# Patient Record
Sex: MALE | Race: WHITE | Employment: FULL TIME | URBAN - NONMETROPOLITAN AREA
[De-identification: names, ages, dates, MRNs, and addresses within clinical notes are randomized per-mention and may not be internally consistent; named-entity substitution may affect disease eponyms.]

---

## 2018-02-13 ENCOUNTER — HOSPITAL ENCOUNTER (INPATIENT)
Age: 63
LOS: 3 days | Discharge: HOME | DRG: 310 | End: 2018-02-16
Attending: EMERGENCY MEDICINE | Admitting: HOSPITALIST
Payer: COMMERCIAL

## 2018-02-13 ENCOUNTER — APPOINTMENT (OUTPATIENT)
Dept: GENERAL RADIOLOGY | Age: 63
DRG: 310 | End: 2018-02-13
Payer: COMMERCIAL

## 2018-02-13 ENCOUNTER — APPOINTMENT (OUTPATIENT)
Dept: CT IMAGING | Age: 63
DRG: 310 | End: 2018-02-13
Payer: COMMERCIAL

## 2018-02-13 DIAGNOSIS — I10 HYPERTENSION, UNSPECIFIED TYPE: ICD-10-CM

## 2018-02-13 DIAGNOSIS — R00.2 PALPITATIONS: Primary | ICD-10-CM

## 2018-02-13 DIAGNOSIS — R42 DIZZINESS: ICD-10-CM

## 2018-02-13 LAB
ALBUMIN SERPL-MCNC: 3.9 GM/DL (ref 3.4–5)
ALP BLD-CCNC: 74 U/L (ref 46–116)
ALT SERPL-CCNC: 25 U/L (ref 14–63)
ANION GAP: 8 MEQ/L (ref 8–16)
AST SERPL-CCNC: 23 U/L (ref 15–37)
BASOPHILS # BLD: 0.7 % (ref 0–3)
BILIRUB SERPL-MCNC: 0.5 MG/DL (ref 0.2–1)
BUN BLDV-MCNC: 16 MG/DL (ref 7–18)
CHLORIDE BLD-SCNC: 104 MEQ/L (ref 98–107)
CO2: 28 MEQ/L (ref 21–32)
CREAT SERPL-MCNC: 1 MG/DL (ref 0.6–1.3)
EOSINOPHILS RELATIVE PERCENT: 2.5 % (ref 0–4)
GFR, ESTIMATED: 81 ML/MIN/1.73M2
GLUCOSE BLD-MCNC: 101 MG/DL (ref 74–106)
HCT VFR BLD CALC: 42.5 % (ref 42–52)
HEMOGLOBIN: 14.3 GM/DL (ref 14–18)
LYMPHOCYTES # BLD: 36.7 % (ref 15–47)
MAGNESIUM: 2.1 MG/DL (ref 1.8–2.4)
MCH RBC QN AUTO: 31.2 PG (ref 27–31)
MCHC RBC AUTO-ENTMCNC: 33.8 GM/DL (ref 33–37)
MCV RBC AUTO: 92.4 FL (ref 80–94)
MONOCYTES: 9.3 % (ref 0–12)
PDW BLD-RTO: 11.7 % (ref 11.5–14.5)
PLATELET # BLD: 286 THOU/MM3 (ref 130–400)
PMV BLD AUTO: 6.9 FL (ref 7.4–10.4)
POC CALCIUM: 9 MG/DL (ref 8.5–10.1)
POTASSIUM SERPL-SCNC: 3.5 MEQ/L (ref 3.5–5.1)
RBC # BLD: 4.59 MILL/MM3 (ref 4.7–6.1)
SEGS: 50.8 % (ref 43–75)
SODIUM BLD-SCNC: 140 MEQ/L (ref 136–145)
TOTAL PROTEIN: 8 GM/DL (ref 6.4–8.2)
TROPONIN I: < 0.017 NG/ML (ref 0.02–0.05)
TROPONIN T: < 0.01 NG/ML
TROPONIN T: < 0.01 NG/ML
WBC # BLD: 5.8 THOU/MM3 (ref 4.8–10.8)

## 2018-02-13 PROCEDURE — 83735 ASSAY OF MAGNESIUM: CPT

## 2018-02-13 PROCEDURE — 71046 X-RAY EXAM CHEST 2 VIEWS: CPT

## 2018-02-13 PROCEDURE — 96374 THER/PROPH/DIAG INJ IV PUSH: CPT

## 2018-02-13 PROCEDURE — 6370000000 HC RX 637 (ALT 250 FOR IP): Performed by: EMERGENCY MEDICINE

## 2018-02-13 PROCEDURE — 99285 EMERGENCY DEPT VISIT HI MDM: CPT

## 2018-02-13 PROCEDURE — G0378 HOSPITAL OBSERVATION PER HR: HCPCS

## 2018-02-13 PROCEDURE — 80053 COMPREHEN METABOLIC PANEL: CPT

## 2018-02-13 PROCEDURE — 2580000003 HC RX 258: Performed by: HOSPITALIST

## 2018-02-13 PROCEDURE — 70450 CT HEAD/BRAIN W/O DYE: CPT

## 2018-02-13 PROCEDURE — 99222 1ST HOSP IP/OBS MODERATE 55: CPT | Performed by: HOSPITALIST

## 2018-02-13 PROCEDURE — 36415 COLL VENOUS BLD VENIPUNCTURE: CPT

## 2018-02-13 PROCEDURE — 93005 ELECTROCARDIOGRAM TRACING: CPT | Performed by: EMERGENCY MEDICINE

## 2018-02-13 PROCEDURE — 85025 COMPLETE CBC W/AUTO DIFF WBC: CPT

## 2018-02-13 PROCEDURE — 2500000003 HC RX 250 WO HCPCS: Performed by: EMERGENCY MEDICINE

## 2018-02-13 PROCEDURE — 1200000003 HC TELEMETRY R&B

## 2018-02-13 PROCEDURE — 6370000000 HC RX 637 (ALT 250 FOR IP): Performed by: HOSPITALIST

## 2018-02-13 PROCEDURE — 6360000002 HC RX W HCPCS: Performed by: HOSPITALIST

## 2018-02-13 PROCEDURE — 84484 ASSAY OF TROPONIN QUANT: CPT

## 2018-02-13 RX ORDER — LISINOPRIL 5 MG/1
5 TABLET ORAL DAILY
Status: DISCONTINUED | OUTPATIENT
Start: 2018-02-13 | End: 2018-02-16 | Stop reason: HOSPADM

## 2018-02-13 RX ORDER — POTASSIUM CHLORIDE 7.45 MG/ML
10 INJECTION INTRAVENOUS PRN
Status: DISCONTINUED | OUTPATIENT
Start: 2018-02-13 | End: 2018-02-16 | Stop reason: HOSPADM

## 2018-02-13 RX ORDER — METOPROLOL TARTRATE 5 MG/5ML
5 INJECTION INTRAVENOUS EVERY 6 HOURS PRN
Status: DISCONTINUED | OUTPATIENT
Start: 2018-02-13 | End: 2018-02-16 | Stop reason: HOSPADM

## 2018-02-13 RX ORDER — POTASSIUM CHLORIDE 20 MEQ/1
40 TABLET, EXTENDED RELEASE ORAL PRN
Status: DISCONTINUED | OUTPATIENT
Start: 2018-02-13 | End: 2018-02-16 | Stop reason: HOSPADM

## 2018-02-13 RX ORDER — CHLORTHALIDONE 25 MG/1
25 TABLET ORAL DAILY
Status: DISCONTINUED | OUTPATIENT
Start: 2018-02-13 | End: 2018-02-16 | Stop reason: HOSPADM

## 2018-02-13 RX ORDER — METOPROLOL TARTRATE 5 MG/5ML
INJECTION INTRAVENOUS
Status: DISCONTINUED
Start: 2018-02-13 | End: 2018-02-14

## 2018-02-13 RX ORDER — METOPROLOL TARTRATE 5 MG/5ML
5 INJECTION INTRAVENOUS ONCE
Status: COMPLETED | OUTPATIENT
Start: 2018-02-13 | End: 2018-02-13

## 2018-02-13 RX ORDER — POTASSIUM CHLORIDE 20MEQ/15ML
40 LIQUID (ML) ORAL PRN
Status: DISCONTINUED | OUTPATIENT
Start: 2018-02-13 | End: 2018-02-16 | Stop reason: HOSPADM

## 2018-02-13 RX ORDER — SODIUM CHLORIDE 9 MG/ML
INJECTION, SOLUTION INTRAVENOUS CONTINUOUS
Status: DISCONTINUED | OUTPATIENT
Start: 2018-02-13 | End: 2018-02-13

## 2018-02-13 RX ORDER — METOPROLOL TARTRATE 50 MG/1
25 TABLET, FILM COATED ORAL ONCE
Status: COMPLETED | OUTPATIENT
Start: 2018-02-13 | End: 2018-02-13

## 2018-02-13 RX ORDER — LORAZEPAM 0.5 MG/1
0.5 TABLET ORAL EVERY 6 HOURS PRN
Status: DISCONTINUED | OUTPATIENT
Start: 2018-02-13 | End: 2018-02-16 | Stop reason: HOSPADM

## 2018-02-13 RX ORDER — ONDANSETRON 2 MG/ML
4 INJECTION INTRAMUSCULAR; INTRAVENOUS EVERY 6 HOURS PRN
Status: DISCONTINUED | OUTPATIENT
Start: 2018-02-13 | End: 2018-02-16 | Stop reason: HOSPADM

## 2018-02-13 RX ORDER — ACETAMINOPHEN 325 MG/1
650 TABLET ORAL EVERY 4 HOURS PRN
Status: DISCONTINUED | OUTPATIENT
Start: 2018-02-13 | End: 2018-02-16 | Stop reason: HOSPADM

## 2018-02-13 RX ORDER — HYDRALAZINE HYDROCHLORIDE 20 MG/ML
10 INJECTION INTRAMUSCULAR; INTRAVENOUS EVERY 6 HOURS PRN
Status: DISCONTINUED | OUTPATIENT
Start: 2018-02-13 | End: 2018-02-16 | Stop reason: HOSPADM

## 2018-02-13 RX ORDER — SODIUM CHLORIDE 0.9 % (FLUSH) 0.9 %
10 SYRINGE (ML) INJECTION EVERY 12 HOURS SCHEDULED
Status: DISCONTINUED | OUTPATIENT
Start: 2018-02-13 | End: 2018-02-16 | Stop reason: HOSPADM

## 2018-02-13 RX ORDER — SODIUM CHLORIDE 0.9 % (FLUSH) 0.9 %
10 SYRINGE (ML) INJECTION PRN
Status: DISCONTINUED | OUTPATIENT
Start: 2018-02-13 | End: 2018-02-16 | Stop reason: HOSPADM

## 2018-02-13 RX ORDER — DOCUSATE SODIUM 100 MG/1
100 CAPSULE, LIQUID FILLED ORAL 2 TIMES DAILY
Status: DISCONTINUED | OUTPATIENT
Start: 2018-02-13 | End: 2018-02-16 | Stop reason: HOSPADM

## 2018-02-13 RX ADMIN — CHLORTHALIDONE 25 MG: 25 TABLET ORAL at 19:48

## 2018-02-13 RX ADMIN — HYDRALAZINE HYDROCHLORIDE 10 MG: 20 INJECTION INTRAMUSCULAR; INTRAVENOUS at 15:52

## 2018-02-13 RX ADMIN — LISINOPRIL 5 MG: 5 TABLET ORAL at 19:48

## 2018-02-13 RX ADMIN — DOCUSATE SODIUM 100 MG: 100 CAPSULE ORAL at 21:42

## 2018-02-13 RX ADMIN — ENOXAPARIN SODIUM 40 MG: 40 INJECTION SUBCUTANEOUS at 15:51

## 2018-02-13 RX ADMIN — Medication 10 ML: at 21:42

## 2018-02-13 RX ADMIN — METOPROLOL TARTRATE 25 MG: 50 TABLET, FILM COATED ORAL at 12:12

## 2018-02-13 RX ADMIN — Medication 10 ML: at 15:52

## 2018-02-13 RX ADMIN — METOROPROLOL TARTRATE 5 MG: 5 INJECTION, SOLUTION INTRAVENOUS at 13:25

## 2018-02-13 ASSESSMENT — ENCOUNTER SYMPTOMS
SORE THROAT: 0
SHORTNESS OF BREATH: 1
WHEEZING: 0
NAUSEA: 0
COUGH: 0
ABDOMINAL PAIN: 0

## 2018-02-13 ASSESSMENT — HEART SCORE: ECG: 1

## 2018-02-13 NOTE — ED PROVIDER NOTES
reviewed and are negative. PAST MEDICAL HISTORY     has a past medical history of Hypertension. SURGICAL HISTORY     has no past surgical history on file. CURRENT MEDICATIONS    There are no discharge medications for this patient. ALLERGIES    has No Known Allergies. FAMILY HISTORY      Uncle with heart disease in his forties. Father  of colon cancer, one sibling with lymphoma, one with breast cancer. indicated that his mother is . He indicated that his father is . He indicated that the status of his paternal uncle is unknown.    family history includes Cancer in his father; Coronary Art Dis in his paternal uncle; Stroke in his mother. SOCIAL HISTORY     reports that he has never smoked. He has never used smokeless tobacco. He reports that he drinks about 1.8 oz of alcohol per week . He reports that he does not use drugs. PHYSICAL EXAM       INITIAL VITALS: BP (!) 182/103   Pulse 68   Temp 98.1 °F (36.7 °C) (Oral)   Resp 20   Ht 6' (1.829 m)   Wt 204 lb (92.5 kg)   SpO2 99%   BMI 27.67 kg/m²        Physical Exam   Constitutional: He is oriented to person, place, and time. He appears well-developed and well-nourished. He appears distressed. HENT:   Head: Atraumatic. Right Ear: External ear normal.   Left Ear: External ear normal.   Nose: Nose normal.   Mouth/Throat: Oropharynx is clear and moist. No oropharyngeal exudate. Eyes: EOM are normal. Pupils are equal, round, and reactive to light. Neck: Neck supple. No JVD present. No thyromegaly present. Cardiovascular: Intact distal pulses. No murmur heard. irregular rhythm, tachycardia   Pulmonary/Chest: Effort normal and breath sounds normal. No respiratory distress. He has no wheezes. Abdominal: Soft. Bowel sounds are normal. He exhibits no mass. There is no tenderness. Musculoskeletal: He exhibits no edema or tenderness. Lymphadenopathy:     He has no cervical adenopathy.    Neurological: He is SpO2: 96%  98% 99%   Weight:   204 lb (92.5 kg)    Height:   6' (1.829 m)        EMERGENCY DEPARTMENT COURSE:    He had already taken  mg orally at home. He was given oral Lopressor 25 mg, with BP not significantly changed. One run of #7 PVC's in a row, followed soon by another run of 3 PVC's in a row observed on the monitor. NO Sx reported with these episodes. Additional IV Lopressor 5 mg given X 2, with BP down to 176/105, HR 78, no more ectopy noted. Test results and plan of care discussed with the patient. Arrangements made for admission. FINAL IMPRESSION      1. Palpitations    2. Dizziness    3. Hypertension, unspecified type        DISPOSITION/PLAN    DISPOSITION Decision To Admit 02/13/2018 12:23:40 PM to [facility], Dr. Taylor Rosario, ambulance transport.     Critical care: 30 minutes               (Please note that portions of this note were completed with a voice recognition program.  Efforts were made to edit the dictations but occasionally words are mis-transcribed.)      Nataly Hodge MD  02/13/18 8124

## 2018-02-13 NOTE — Clinical Note
Patient Class: Observation [104]   REQUIRED: Diagnosis: Intermittent palpitations [[id number]]   Estimated Length of Stay: Estimated stay of less than 2 midnights   Future Attending Provider: [facility] [[id number]]   Admitting Provider: [facility] [[id number]]   Telemetry Bed Required?: Yes

## 2018-02-13 NOTE — ED NOTES
Patient noted to have a run of 7 PVC's on cardiac monitor although did not notice anything.      Janet Lama RN  02/13/18 8476

## 2018-02-13 NOTE — H&P
History & Physical        Patient:  Henny Fernández  YOB: 1955    MRN: [id number]     Acct: [de-identified]    PCP: Barby Llamas    Date of Admission: 2/13/2018    Date of Service: Pt seen/examined on 02/13/2018 and Placed in Observation. Chief Complaint:  palpitations      History Of Present Illness:      66 y.o. male who presented to [address] [place] [address] transferred from [facility] ER after presenting with a main complaint of intermittent palpitations and lightheadedness that happened this morning while at work. Patient states he is going through a lot of stressors in his personal life and this felt like panic attacks which he used to have in the past. At [facility] ER he was found to have and elevated blood pressure and transfer here for further evaluation. At the time of my evaluation he states to feel better and denies any current symptoms such as nausea, vomiting,SOB or palpitations. Past Medical History:          Diagnosis Date    Hypertension     \" borderline\"       Past Surgical History:      History reviewed. No pertinent surgical history. Medications Prior to Admission:      Prior to Admission medications    Not on File       Allergies:  Review of patient's allergies indicates no known allergies. Social History:      The patient currently lives at home    TOBACCO:   reports that he has never smoked. He has never used smokeless tobacco.  ETOH:   reports that he drinks about 1.8 oz of alcohol per week . Family History:      Reviewed in detail and positive as follows:        Problem Relation Age of Onset   [facility] Stroke Mother     Cancer Father     Coronary Art Dis Paternal Uncle        Diet:  DIET GENERAL;    REVIEW OF SYSTEMS:   Pertinent positives as noted in the HPI. All other systems reviewed and negative.     PHYSICAL EXAM:    BP (!) 171/96   Pulse 79   Temp 98.1 °F (36.7 °C) (Oral)   Resp 20   Ht 6' (1.829 m)   Wt 204 lb (92.5 kg)   SpO2 98%   BMI 27.67 kg/m² General appearance:  No apparent distress, appears stated age and cooperative. HEENT:  Normal cephalic, atraumatic without obvious deformity. Pupils equal, round, and reactive to light. Extra ocular muscles intact. Conjunctivae/corneas clear. Neck: Supple, with full range of motion. No jugular venous distention. Trachea midline. Respiratory:  Normal respiratory effort. Clear to auscultation, bilaterally without Rales/Wheezes/Rhonchi. Cardiovascular:  Regular rate and rhythm with normal S1/S2 without murmurs, rubs or gallops. Abdomen: Soft, non-tender, non-distended with normal bowel sounds. Musculoskeletal:  No clubbing, cyanosis or edema bilaterally. Full range of motion without deformity. Skin: Skin color, texture, turgor normal.  No rashes or lesions. Neurologic:  Neurovascularly intact without any focal sensory/motor deficits. Cranial nerves: II-XII intact, grossly non-focal.  Psychiatric:  Alert and oriented, thought content appropriate, normal insight  Capillary Refill: Brisk,< 3 seconds   Peripheral Pulses: +2 palpable, equal bilaterally       Labs:     Recent Labs      02/13/18   1055   WBC  5.8   HGB  14.3   HCT  42.5   PLT  286     Recent Labs      02/13/18   1055   NA  140   K  3.5   CL  104   CO2  28   BUN  16   CREATININE  1.0     Recent Labs      02/13/18   1055   AST  23   ALT  25   BILITOT  0.5   ALKPHOS  74     No results for input(s): INR in the last 72 hours. Recent Labs      02/13/18   1055   TROPONINI  < 0.017       Urinalysis:    No results found for: [place], BACTERIA, RBCUA, BLOODU, [address], GLUCOSEU    Radiology:       EKG:  I have reviewed the EKG with the following interpretation: NSR, PVC    XR CHEST STANDARD (2 VW)   Final Result   No acute cardiopulmonary disease               **This report has been created using voice recognition software. It may contain minor errors which are inherent in voice recognition technology. **      Final report electronically signed by Dr. Rachel Berman on 2/13/2018 11:15 AM      CT HEAD WO CONTRAST   Final Result    No evidence of an acute process. **This report has been created using voice recognition software. It may contain minor errors which are inherent in voice recognition technology. **      Final report electronically signed by Dr. Rachel Berman on 2/13/2018 11:05 AM               DVT prophylaxis: [x] Lovenox                                 [] SCDs                                 [] SQ Heparin                                 [] Encourage ambulation           [] Already on Anticoagulation    Code Status: Full Code          Disposition:    [x] Home       [] TCU       [] Rehab       [] Psych       [] SNF       [] [facility]       [] Other-    ASSESSMENT:    Active Hospital Problems    Diagnosis Date Noted    Intermittent palpitations [R00.2] 02/13/2018       PLAN:    1. HTN (newly diagnosed?)  -No history of HTN and  Unclear if this is due to a \"panic attack\"  -Will start low dose thiazide and ACE  -Hydralazine IV PRN  -He has been advised on close follow up with PCP for optimization of BP meds and other labs for further risk stratification. I will avoid to do them here to avoid anxiety. 2. Suspect Panic Attack  -Low dose benzos PRN    Thank you Melinda Hoover for the opportunity to be involved in this patient's care.     Electronically signed by Lizeth Ortiz MD on 2/13/2018 at 4:48 PM

## 2018-02-13 NOTE — ED NOTES
Ambulated to bathroom. Steady on feet. States he still feels slightly dizzy at this time but no different when walking vs laying down.      Mary Carmen Collazo RN  02/13/18 9108

## 2018-02-14 ENCOUNTER — APPOINTMENT (OUTPATIENT)
Dept: MRI IMAGING | Age: 63
DRG: 310 | End: 2018-02-14
Payer: COMMERCIAL

## 2018-02-14 PROBLEM — Z86.73 OLD CEREBROVASCULAR ACCIDENT (CVA) WITHOUT LATE EFFECT: Status: ACTIVE | Noted: 2018-02-14

## 2018-02-14 PROBLEM — I10 ESSENTIAL HYPERTENSION: Status: ACTIVE | Noted: 2018-02-14

## 2018-02-14 PROBLEM — R42 DIZZINESS: Status: ACTIVE | Noted: 2018-02-14

## 2018-02-14 LAB
ANION GAP SERPL CALCULATED.3IONS-SCNC: 13 MEQ/L (ref 8–16)
AVERAGE GLUCOSE: 93 MG/DL (ref 70–126)
BUN BLDV-MCNC: 12 MG/DL (ref 7–22)
CALCIUM SERPL-MCNC: 8.9 MG/DL (ref 8.5–10.5)
CHLORIDE BLD-SCNC: 100 MEQ/L (ref 98–111)
CHOLESTEROL, TOTAL: 181 MG/DL (ref 100–199)
CO2: 28 MEQ/L (ref 23–33)
CREAT SERPL-MCNC: 0.9 MG/DL (ref 0.4–1.2)
EKG ATRIAL RATE: 93 BPM
EKG ATRIAL RATE: 94 BPM
EKG P AXIS: 37 DEGREES
EKG P AXIS: 39 DEGREES
EKG P-R INTERVAL: 144 MS
EKG P-R INTERVAL: 148 MS
EKG Q-T INTERVAL: 378 MS
EKG Q-T INTERVAL: 378 MS
EKG QRS DURATION: 86 MS
EKG QRS DURATION: 92 MS
EKG QTC CALCULATION (BAZETT): 469 MS
EKG QTC CALCULATION (BAZETT): 472 MS
EKG R AXIS: 58 DEGREES
EKG R AXIS: 62 DEGREES
EKG T AXIS: 24 DEGREES
EKG T AXIS: 32 DEGREES
EKG VENTRICULAR RATE: 93 BPM
EKG VENTRICULAR RATE: 94 BPM
GFR SERPL CREATININE-BSD FRML MDRD: 86 ML/MIN/1.73M2
GLUCOSE BLD-MCNC: 104 MG/DL (ref 70–108)
HBA1C MFR BLD: 5.1 % (ref 4.4–6.4)
HCT VFR BLD CALC: 43.1 % (ref 42–52)
HDLC SERPL-MCNC: 101 MG/DL
HEMOGLOBIN: 14.8 GM/DL (ref 14–18)
LDL CHOLESTEROL CALCULATED: 52 MG/DL
LV EF: 58 %
LVEF MODALITY: NORMAL
MAGNESIUM: 2.1 MG/DL (ref 1.6–2.4)
MCH RBC QN AUTO: 32.1 PG (ref 27–31)
MCHC RBC AUTO-ENTMCNC: 34.4 GM/DL (ref 33–37)
MCV RBC AUTO: 93.4 FL (ref 80–94)
PDW BLD-RTO: 13.2 % (ref 11.5–14.5)
PLATELET # BLD: 276 THOU/MM3 (ref 130–400)
PMV BLD AUTO: 7.3 FL (ref 7.4–10.4)
POTASSIUM REFLEX MAGNESIUM: 3.6 MEQ/L (ref 3.5–5.2)
RBC # BLD: 4.61 MILL/MM3 (ref 4.7–6.1)
SODIUM BLD-SCNC: 141 MEQ/L (ref 135–145)
TRIGL SERPL-MCNC: 142 MG/DL (ref 0–199)
WBC # BLD: 5.7 THOU/MM3 (ref 4.8–10.8)

## 2018-02-14 PROCEDURE — 2580000003 HC RX 258: Performed by: HOSPITALIST

## 2018-02-14 PROCEDURE — 86141 C-REACTIVE PROTEIN HS: CPT

## 2018-02-14 PROCEDURE — 6360000002 HC RX W HCPCS: Performed by: HOSPITALIST

## 2018-02-14 PROCEDURE — G0378 HOSPITAL OBSERVATION PER HR: HCPCS

## 2018-02-14 PROCEDURE — 99233 SBSQ HOSP IP/OBS HIGH 50: CPT | Performed by: INTERNAL MEDICINE

## 2018-02-14 PROCEDURE — 6370000000 HC RX 637 (ALT 250 FOR IP): Performed by: INTERNAL MEDICINE

## 2018-02-14 PROCEDURE — 70551 MRI BRAIN STEM W/O DYE: CPT

## 2018-02-14 PROCEDURE — 6370000000 HC RX 637 (ALT 250 FOR IP): Performed by: HOSPITALIST

## 2018-02-14 PROCEDURE — 93306 TTE W/DOPPLER COMPLETE: CPT

## 2018-02-14 PROCEDURE — 83036 HEMOGLOBIN GLYCOSYLATED A1C: CPT

## 2018-02-14 PROCEDURE — 99223 1ST HOSP IP/OBS HIGH 75: CPT | Performed by: INTERNAL MEDICINE

## 2018-02-14 PROCEDURE — 85027 COMPLETE CBC AUTOMATED: CPT

## 2018-02-14 PROCEDURE — 93010 ELECTROCARDIOGRAM REPORT: CPT | Performed by: INTERNAL MEDICINE

## 2018-02-14 PROCEDURE — 1200000003 HC TELEMETRY R&B

## 2018-02-14 PROCEDURE — 36415 COLL VENOUS BLD VENIPUNCTURE: CPT

## 2018-02-14 PROCEDURE — 80048 BASIC METABOLIC PNL TOTAL CA: CPT

## 2018-02-14 PROCEDURE — 80061 LIPID PANEL: CPT

## 2018-02-14 PROCEDURE — 83735 ASSAY OF MAGNESIUM: CPT

## 2018-02-14 RX ORDER — ASPIRIN 81 MG/1
81 TABLET, CHEWABLE ORAL DAILY
Status: DISCONTINUED | OUTPATIENT
Start: 2018-02-14 | End: 2018-02-16 | Stop reason: HOSPADM

## 2018-02-14 RX ORDER — CARVEDILOL 3.12 MG/1
3.12 TABLET ORAL 2 TIMES DAILY WITH MEALS
Status: DISCONTINUED | OUTPATIENT
Start: 2018-02-14 | End: 2018-02-15

## 2018-02-14 RX ADMIN — ENOXAPARIN SODIUM 40 MG: 40 INJECTION SUBCUTANEOUS at 16:25

## 2018-02-14 RX ADMIN — CARVEDILOL 3.12 MG: 3.12 TABLET, FILM COATED ORAL at 16:23

## 2018-02-14 RX ADMIN — Medication 10 ML: at 08:22

## 2018-02-14 RX ADMIN — DOCUSATE SODIUM 100 MG: 100 CAPSULE ORAL at 20:10

## 2018-02-14 RX ADMIN — Medication 10 ML: at 20:10

## 2018-02-14 RX ADMIN — ASPIRIN 81 MG: 81 TABLET, CHEWABLE ORAL at 16:22

## 2018-02-14 RX ADMIN — LISINOPRIL 5 MG: 5 TABLET ORAL at 08:22

## 2018-02-14 RX ADMIN — CHLORTHALIDONE 25 MG: 25 TABLET ORAL at 08:21

## 2018-02-14 ASSESSMENT — PAIN SCALES - GENERAL
PAINLEVEL_OUTOF10: 0

## 2018-02-14 NOTE — PLAN OF CARE
Problem: Falls - Risk of  Goal: Absence of falls  Outcome: Met This Shift  NO fall this shift. Pt is up independent. Problem: Safety:  Goal: Free from accidental physical injury  Free from accidental physical injury   Outcome: Met This Shift  Pt is alert and oriented. Independent. Problem: Pain:  Goal: Patient's pain/discomfort is manageable  Patient's pain/discomfort is manageable   Outcome: Met This Shift  Denies any pain will continue to  monitor. Problem: Discharge Planning:  Goal: Patients continuum of care needs are met  Patients continuum of care needs are met   Outcome: Ongoing  Pt is anxious to go home today. Several test in progress. Will wait for outcome to determine POC. Comments: Care plan reviewed with patient . Patient verbalize understanding of the plan of care and contribute to goal setting.   Electronically signed by Shawn Elizabeth RN on 2/14/2018 at 2:40 PM

## 2018-02-15 ENCOUNTER — APPOINTMENT (OUTPATIENT)
Dept: INTERVENTIONAL RADIOLOGY/VASCULAR | Age: 63
DRG: 310 | End: 2018-02-15
Payer: COMMERCIAL

## 2018-02-15 PROBLEM — I48.0 PAF (PAROXYSMAL ATRIAL FIBRILLATION) (HCC): Status: ACTIVE | Noted: 2018-02-13

## 2018-02-15 LAB
C-REACTIVE PROTEIN, HIGH SENSITIVITY: 5.7 MG/L
POTASSIUM SERPL-SCNC: 3.8 MEQ/L (ref 3.5–5.2)
T4 FREE: 1.59 NG/DL (ref 0.93–1.76)
TSH SERPL DL<=0.05 MIU/L-ACNC: 0.38 UIU/ML (ref 0.4–4.2)

## 2018-02-15 PROCEDURE — 93880 EXTRACRANIAL BILAT STUDY: CPT

## 2018-02-15 PROCEDURE — 1200000003 HC TELEMETRY R&B

## 2018-02-15 PROCEDURE — 84439 ASSAY OF FREE THYROXINE: CPT

## 2018-02-15 PROCEDURE — 6370000000 HC RX 637 (ALT 250 FOR IP): Performed by: HOSPITALIST

## 2018-02-15 PROCEDURE — 6370000000 HC RX 637 (ALT 250 FOR IP): Performed by: PHYSICIAN ASSISTANT

## 2018-02-15 PROCEDURE — 84443 ASSAY THYROID STIM HORMONE: CPT

## 2018-02-15 PROCEDURE — 36415 COLL VENOUS BLD VENIPUNCTURE: CPT

## 2018-02-15 PROCEDURE — 1200000000 HC SEMI PRIVATE

## 2018-02-15 PROCEDURE — 84132 ASSAY OF SERUM POTASSIUM: CPT

## 2018-02-15 PROCEDURE — 99232 SBSQ HOSP IP/OBS MODERATE 35: CPT | Performed by: PHYSICIAN ASSISTANT

## 2018-02-15 PROCEDURE — 2580000003 HC RX 258: Performed by: HOSPITALIST

## 2018-02-15 PROCEDURE — 99232 SBSQ HOSP IP/OBS MODERATE 35: CPT | Performed by: INTERNAL MEDICINE

## 2018-02-15 PROCEDURE — 6370000000 HC RX 637 (ALT 250 FOR IP): Performed by: INTERNAL MEDICINE

## 2018-02-15 RX ORDER — ASPIRIN 81 MG/1
81 TABLET, CHEWABLE ORAL DAILY
Qty: 30 TABLET | Refills: 3 | Status: SHIPPED | OUTPATIENT
Start: 2018-02-16

## 2018-02-15 RX ORDER — CARVEDILOL 6.25 MG/1
6.25 TABLET ORAL 2 TIMES DAILY WITH MEALS
Status: DISCONTINUED | OUTPATIENT
Start: 2018-02-15 | End: 2018-02-16 | Stop reason: HOSPADM

## 2018-02-15 RX ORDER — POTASSIUM CHLORIDE 20 MEQ/1
20 TABLET, EXTENDED RELEASE ORAL
Status: DISCONTINUED | OUTPATIENT
Start: 2018-02-16 | End: 2018-02-16 | Stop reason: HOSPADM

## 2018-02-15 RX ORDER — LISINOPRIL 5 MG/1
5 TABLET ORAL DAILY
Qty: 30 TABLET | Refills: 3 | Status: SHIPPED | OUTPATIENT
Start: 2018-02-16

## 2018-02-15 RX ORDER — CARVEDILOL 6.25 MG/1
6.25 TABLET ORAL 2 TIMES DAILY WITH MEALS
Qty: 60 TABLET | Refills: 3 | Status: SHIPPED | OUTPATIENT
Start: 2018-02-15

## 2018-02-15 RX ADMIN — CHLORTHALIDONE 25 MG: 25 TABLET ORAL at 08:37

## 2018-02-15 RX ADMIN — CARVEDILOL 6.25 MG: 6.25 TABLET, FILM COATED ORAL at 18:06

## 2018-02-15 RX ADMIN — CARVEDILOL 6.25 MG: 6.25 TABLET, FILM COATED ORAL at 10:22

## 2018-02-15 RX ADMIN — DOCUSATE SODIUM 100 MG: 100 CAPSULE ORAL at 22:52

## 2018-02-15 RX ADMIN — LISINOPRIL 5 MG: 5 TABLET ORAL at 08:37

## 2018-02-15 RX ADMIN — DOCUSATE SODIUM 100 MG: 100 CAPSULE ORAL at 08:38

## 2018-02-15 RX ADMIN — ASPIRIN 81 MG: 81 TABLET, CHEWABLE ORAL at 08:37

## 2018-02-15 RX ADMIN — Medication 10 ML: at 08:37

## 2018-02-15 ASSESSMENT — PAIN SCALES - GENERAL
PAINLEVEL_OUTOF10: 0

## 2018-02-15 NOTE — CARE COORDINATION
UPDATE:    Echo - 2/14/18   Ejection fraction was   estimated at 55 to 60 %. Adjusting medications/increased Coreg, monitoring Magnesium and K+3.8 today  Monitoring B/P 161/92  To 145/104 today, TSH 0.384      Abnormal Imaging:   MRI brain 2/14-      Impression:           1. No evidence of an acute process. 2. Small old infarcts in the right cerebellum and left thalamus. 3. Minimal severity chronic small vessel ischemic changes. Discharge: plans home with son.

## 2018-02-15 NOTE — FLOWSHEET NOTE
02/15/18 1558   Provider Notification   Reason for Communication Review case   Provider Name DR Brown   Provider Notification Physician   Method of Communication Secure Message   Response Waiting for response   Notification Time    notified that DR Fredy Steele doesn't feel pt needs Xarelto currently. Will further review after 48 holter monitor.  Possible need for loop recorder

## 2018-02-15 NOTE — CONSULTS
CAD. He is not aware of dyslipidemia. In fact,, his lipid profile is excellent without any treatment. He is not a smoker, and his BP is noted on admission. Very few CVRF. Patient Active Problem List    Diagnosis Date Noted    Dizziness 02/14/2018     Priority: Low    Old cerebrovascular accident (CVA)  02/14/2018     Priority: Low    Essential hypertension 02/14/2018     Priority: Low    Intermittent palpitations 02/13/2018     Priority: Low         PLAN:    I strongly advised him to decrease ETOH intake. In the meantime, he needs replacement of K.  Echocardiogram was good. Lipid profile is excellent. I agree with Carvedilol for treatment of hypertension and PVC's. I would add an ACEI or ARB if needed for better control of BP. I would consider adding Norvasc if needed, but I think that would not be necessary if he decreases his ETOH intake. I don't prefer diuretics at this time. I recommend follow up in the office within 1-2 months.       Brigida España MD, [facility]  8:18 PM  2/14/2018

## 2018-02-16 VITALS
HEIGHT: 72 IN | HEART RATE: 91 BPM | BODY MASS INDEX: 26.7 KG/M2 | DIASTOLIC BLOOD PRESSURE: 82 MMHG | OXYGEN SATURATION: 98 % | WEIGHT: 197.1 LBS | RESPIRATION RATE: 17 BRPM | SYSTOLIC BLOOD PRESSURE: 131 MMHG | TEMPERATURE: 98.1 F

## 2018-02-16 LAB — POTASSIUM SERPL-SCNC: 4 MEQ/L (ref 3.5–5.2)

## 2018-02-16 PROCEDURE — 36415 COLL VENOUS BLD VENIPUNCTURE: CPT

## 2018-02-16 PROCEDURE — 6370000000 HC RX 637 (ALT 250 FOR IP): Performed by: HOSPITALIST

## 2018-02-16 PROCEDURE — 84132 ASSAY OF SERUM POTASSIUM: CPT

## 2018-02-16 PROCEDURE — 99239 HOSP IP/OBS DSCHRG MGMT >30: CPT | Performed by: INTERNAL MEDICINE

## 2018-02-16 PROCEDURE — 6370000000 HC RX 637 (ALT 250 FOR IP): Performed by: PHYSICIAN ASSISTANT

## 2018-02-16 PROCEDURE — 93226 XTRNL ECG REC<48 HR SCAN A/R: CPT

## 2018-02-16 PROCEDURE — 93225 XTRNL ECG REC<48 HRS REC: CPT

## 2018-02-16 PROCEDURE — 6370000000 HC RX 637 (ALT 250 FOR IP): Performed by: INTERNAL MEDICINE

## 2018-02-16 RX ADMIN — POTASSIUM CHLORIDE 20 MEQ: 1500 TABLET, EXTENDED RELEASE ORAL at 09:13

## 2018-02-16 RX ADMIN — ASPIRIN 81 MG: 81 TABLET, CHEWABLE ORAL at 09:13

## 2018-02-16 RX ADMIN — LISINOPRIL 5 MG: 5 TABLET ORAL at 09:13

## 2018-02-16 RX ADMIN — DOCUSATE SODIUM 100 MG: 100 CAPSULE ORAL at 09:13

## 2018-02-16 RX ADMIN — CARVEDILOL 6.25 MG: 6.25 TABLET, FILM COATED ORAL at 09:13

## 2018-02-16 RX ADMIN — CHLORTHALIDONE 25 MG: 25 TABLET ORAL at 09:13

## 2018-02-16 ASSESSMENT — PAIN SCALES - GENERAL
PAINLEVEL_OUTOF10: 0
PAINLEVEL_OUTOF10: 0

## 2018-02-16 NOTE — PROGRESS NOTES
Educated on discharge instructions, follow up appointments, and medications. No further questions or concerns voiced. Discharged home with significant other.
Hospitalist Progress Note    Patient:  Bryn Sol      Unit/Bed:8B-38/038-A    YOB: 1955    MRN: [id number]       Acct: [de-identified]     PCP: Caroline Khan    Date of Admission: 2/13/2018  Chief Complaint:   Chief Complaint   Patient presents with    Panic Attack    Dizziness       Subjective:[place] is a 66 y.o. male admitted to [address] [place] [address] on 2/13/2018 for  Dizziness and palpitations, symptoms resolving, discussed findings and updated plan of care    REVIEW OF SYSTEMS:   CONSTITUTIONAL: Denies fevers, denies recent illnesses. EYES: Denies any vision changes. ENT: Denies any throat pain. NECK: Denies any neck pain. CARDIOVASCULAR: Denies chest pain. Denies palpitations. RESPIRATORY: Denies shortness of breath, denies cough, denies history of asthma or any pulmonary illnesses. GASTROINTESTINAL:Denies nausea. Negative for emesis, No abdominal pain. Negative for diarrhea. GENITOURINARY: Negative for dysuria. Negative for urinary frequency or urgency.   .    Medications:  Reviewed  Infusion Medications    Scheduled Medications    carvedilol  6.25 mg Oral BID     [START ON 2/16/2018] potassium chloride  20 mEq Oral Daily with breakfast    rivaroxaban  20 mg Oral Daily    aspirin  81 mg Oral Daily    sodium chloride flush  10 mL Intravenous 2 times per day    docusate sodium  100 mg Oral BID    lisinopril  5 mg Oral Daily    chlorthalidone  25 mg Oral Daily     PRN Meds: sodium chloride flush, acetaminophen, ondansetron, potassium chloride **OR** potassium chloride **OR** potassium chloride, potassium chloride, magnesium sulfate, metoprolol, hydrALAZINE, LORazepam    Intake/Output Summary (Last 24 hours) at 02/15/18 1442  Last data filed at 02/15/18 1019   Gross per 24 hour   Intake             3010 ml   Output                0 ml   Net             3010 ml     Exam:  BP (!) 146/104   Pulse 97   Temp 98.4 °F (36.9 °C) (Oral)   Resp 17   Ht 6' (1.829 m)   [employer]
Component Value Date    TRIG 142 02/14/2018     02/14/2018    LDLCALC 52 02/14/2018       TSH:  No results found for: TSH      Assessment:    Palpitations  PVCs  Hypokalemia  HTN  Ef 55-60 per echo 2/14/18  ETOH abuse  ? panic attack    Plan:  · Keep K >4 and mag >2  · Potassium level today and replace  · Increase coreg to 6.25 bid  · outpt 48 hour holter  · F/up dr Jason Oviedo 2 weeks  · Will see prn         Electronically signed by David Long PA-C on 2/15/2018 at 8:23 AM
[] Already on Anticoagulation     Disposition:    [x] Home       [] TCU       [] Rehab       [] Psych       [] SNF       [] [facility]       [] Other-    Code Status: Full Code  Assessment/Plan:    Active Hospital Problems    Diagnosis    Dizziness [R42]    Old cerebrovascular accident (CVA)  [Z86.73]    Essential hypertension [I10]    Intermittent palpitations [R00.2]         · Etiology unclear, abnormal CT with old CVA, check MRI of brain today, continue ASA for now, check lipid panel  · As above  · Improved, continue chlorthalidone, coreg, and lisinopriol  · Telemetry consistent with intermittent PAF, check echocardiogram, consult cardiology for recommendations    Diet: DIET GENERAL;  Electronically signed by Albin Forbes MD on 2/14/2018 at 12:07 PM

## 2018-02-16 NOTE — DISCHARGE SUMMARY
Hospital Medicine Discharge Summary      Patient Identification:   Dennis Manning   : 1955  MRN: [id number]   Account: [de-identified]      Patient's PCP: Jesús Paniagua Date: 2018     Discharge Date: 18    Admitting Physician: Temo Fonseca MD     Discharge Physician: Aura Lennox, MD     Discharge Diagnoses: Active Hospital Problems    Diagnosis    Intermittent palpitations [R00.2]     Priority: High    PAF (paroxysmal atrial fibrillation) (HCC) [I48.0]     Priority: High    Dizziness [R42]    Old cerebrovascular accident (CVA)  [Z86.73]    Essential hypertension [I10]         Hospital Course:   Dennis Manning is a 66 y.o. male admitted to [facility] on 2018 for palpitations, secondary to PAF and dizziness, MRI consistent with old CVAs, will continue ASA, LDL less than 100, unclear benefit of statin for CVA prevention, cardiology recommending outpatient Holter monitor, and hold on anticoagulation at this time, symptoms resolving, patient may dc home today if ok with cardiology           Exam:   Vitals:  Vitals:    02/15/18 2245 18 0612 18 0911 18 1121   BP: 96/63 116/73 (!) 150/88 131/82   Pulse: 63 96 83 91   Resp:    Temp: 98.1 °F (36.7 °C) 97.9 °F (36.6 °C) 97.9 °F (36.6 °C) 98.1 °F (36.7 °C)   TempSrc: Oral Oral Oral Oral   SpO2: 97% 96% 98% 98%   Weight:  197 lb 1.6 oz (89.4 kg)     Height:         Weight: Weight: 197 lb 1.6 oz (89.4 kg)   24 hour intake/output:    Intake/Output Summary (Last 24 hours) at 18 1145  Last data filed at 18 1038   Gross per 24 hour   Intake             1285 ml   Output              201 ml   Net             1084 ml     General appearance:  No apparent distress, appears stated age and cooperative. HEENT:  Normal cephalic, atraumatic without obvious deformity. Pupils equal, round, and reactive to light. Extra ocular muscles intact. Conjunctivae/corneas clear.   Neck: be involved in this patient's care.     Electronically signed by Keon Friedman MD on 2/16/2018 at 11:45 AM

## 2018-02-16 NOTE — FLOWSHEET NOTE
· Subjective: Patient calmly greeted  upon entry and shared about his \"scary\" incident at work. He shared that he became \"lightheaded at work due to high blood pressure. \" He stated that his co-workers became concerned and called for assistance. · Objective: Patient was receptive to the  and engaged in conversation. He shared that this is his first time in the hospital as a patient. He stated that he is now on some medication for blood pressure which is \"doable\" for him. He shared that he will return to work with no restrictions. · Assessment: Patient was grateful for his recovery and that no damage was done to his heart. He was patiently awaiting his discharge by spending time on his cell phone. Patient has a significant other (joseph') for support, as well as a son and other family members. He is  registered as Judaism, but does not belong to a local community of gay for spiritual support. He was  grateful for the ministry of the  during this encounter. · Plan: There are no other spiritual care needs at this time. However, chaplains will remain available for further emotional and spiritual support as needed. 02/16/18 1000   Encounter Summary   Services provided to: Patient   Referral/Consult From: [facility]   Support System Significant other;Children;Family members   Place of Anabaptist None   Continue Visiting Yes  (2/16/2018)   Complexity of Encounter Moderate   Length of Encounter 15 minutes   Spiritual Assessment Completed Yes   Grief and Life Adjustment   Type New Diagnosis   Assessment Calm;Coping   Intervention Active listening;Explored feelings, thoughts, concerns; Discussed illness/injury and it's impact;Sustaining presence/ Ministry of presence   Outcome Receptive; Expressed feelings/needs/concerns;Expressed gratitude

## 2018-02-28 NOTE — PROCEDURES
[address] [place] [address]                                  HOLTER MONITOR    PATIENT NAME: Porter Pillai                  :        1955  MED REC NO:   [id number]                           ROOM:       [id number]  ACCOUNT NO:   [de-identified]                           ADMIT DATE: 2018  PROVIDER:     Ian Blanchard M.D.    [facility] Juan:  2018    CLINICAL INDICATION:  This is a 62-year-old patient with palpitation. HOLTER MONITOR DESCRIPTION:  Holter monitor was attached to the patient for  a total of 47 hours 59 minutes. Total number of beats was 917,066. HOLTER MONITOR ANALYSIS:  Minimum heart rate was 52 beats per minute. Maximum was 122 beats per minute. Average heart rate was 87 beats per  minute. Predominant rhythm is sinus rhythm. There were occasional PVCs,  total of 1,264. Frequent PACs total of 3,645. There was no obvious  V-tach, SVT, or pauses. CONCLUSION:  1. Sinus rhythm with occasional PVCs and frequent PACs. 2.  No significant arrhythmias. 3.  Clinical correlation is recommended.         Marisol Chu M.D.    D: 2018 7:37:07       T: 2018 8:43:40     [email or web address]  Job#: [id number]     Doc#: [id number]    CC:

## 2018-03-12 ENCOUNTER — TELEPHONE (OUTPATIENT)
Dept: CARDIOLOGY CLINIC | Age: 63
End: 2018-03-12